# Patient Record
Sex: FEMALE | Race: WHITE | URBAN - METROPOLITAN AREA
[De-identification: names, ages, dates, MRNs, and addresses within clinical notes are randomized per-mention and may not be internally consistent; named-entity substitution may affect disease eponyms.]

---

## 2017-01-23 DIAGNOSIS — M54.50 LUMBAGO: Primary | ICD-10-CM

## 2017-04-05 ENCOUNTER — OFFICE VISIT (OUTPATIENT)
Dept: ORTHOPEDICS | Facility: CLINIC | Age: 22
End: 2017-04-05

## 2017-04-05 VITALS — WEIGHT: 146.7 LBS | BODY MASS INDEX: 25.04 KG/M2 | HEIGHT: 64 IN

## 2017-04-05 DIAGNOSIS — M54.6 BILATERAL THORACIC BACK PAIN, UNSPECIFIED CHRONICITY: Primary | ICD-10-CM

## 2017-04-05 ASSESSMENT — ENCOUNTER SYMPTOMS
BACK PAIN: 1
NECK PAIN: 1

## 2017-04-05 NOTE — NURSING NOTE
"Reason For Visit:   Chief Complaint   Patient presents with     Back Pain       Primary MD: Luis Felipe Munguia  Ref. MD: established    ?  No  Occupation Full-time student.  Currently working? No.  Date of injury:12/2016  Type of injury: Playing hokey.  Date of surgery: none  Smoker: No      Ht 1.632 m (5' 4.25\")  Wt 66.5 kg (146 lb 11.2 oz)  BMI 24.99 kg/m2    Pain Assessment  Patient Currently in Pain: Yes  0-10 Pain Scale: 5  Primary Pain Location: Back  Pain Orientation: Mid  Pain Descriptors: Constant, Dull  Alleviating Factors: Other (comment) (sometimes exercise)  Aggravating Factors: Other (comment), Lying (worse in morning)            "

## 2017-04-05 NOTE — MR AVS SNAPSHOT
"              After Visit Summary   4/5/2017    Merle Estrada    MRN: [id number]           Patient Information     Date Of Birth          1995        Visit Information        Provider Department      4/5/2017 7:15 AM Christie Larry MD [facility]        Today's Diagnoses     Bilateral thoracic back pain, unspecified chronicity    -  1       Follow-ups after your visit        Who to contact     Please call your clinic at [phone] to:    Ask questions about your health    Make or cancel appointments    Discuss your medicines    Learn about your test results    Speak to your doctor   If you have compliments or concerns about an experience at your clinic, or if you wish to file a complaint, please contact [facility] Patient Relations at [phone] or email us at [email or web address]         Additional Information About Your Visit        MyChart Information     [employer] gives you secure access to your electronic health record. If you see a primary care provider, you can also send messages to your care team and make appointments. If you have questions, please call your primary care clinic.  If you do not have a primary care provider, please call [phone] and they will assist you.      [employer] is an electronic gateway that provides easy, online access to your medical records. With [employer], you can request a clinic appointment, read your test results, renew a prescription or communicate with your care team.     To access your existing account, please contact your [facility] or call [phone] for assistance.        Care EveryWhere ID     This is your Care EveryWhere ID. This could be used by other organizations to access your [place] medical records  [id number]        Your Vitals Were     Height BMI (Body Mass Index)                1.632 m (5' 4.25\") 24.99 kg/m2           Blood Pressure from Last 3 Encounters: "   08/07/15 122/61   06/10/15 104/60   08/10/11 100/60    Weight from Last 3 Encounters:   04/05/17 66.5 kg (146 lb 11.2 oz)   10/07/15 63.5 kg (140 lb) (70 %)*   08/07/15 63.5 kg (140 lb) (71 %)*     * Growth percentiles are based on [facility] 2-20 Years data.              Today, you had the following     No orders found for display         Today's Medication Changes          These changes are accurate as of: 4/5/17 11:59 PM.  If you have any questions, ask your nurse or doctor.               Start taking these medicines.        Dose/Directions    diclofenac 1 % Gel topical gel   Commonly known as:  VOLTAREN   Used for:  Bilateral thoracic back pain, unspecified chronicity        Apply 2-4 grams to back up to four times daily using enclosed dosing card.   Quantity:  100 g   Refills:  1            Where to get your medicines      These medications were sent to [place] [address] [place] [address]  [address] [place] [address]     Phone:  [phone]     diclofenac 1 % Gel topical gel                Primary Care Provider Office Phone # Fax #    Luis Felipe Munguia [phone]       [facility] [place] [address]  [facility] [address]        Thank you!     Thank you for choosing [facility]  for your care. Our goal is always to provide you with excellent care. Hearing back from our patients is one way we can continue to improve our services. Please take a few minutes to complete the written survey that you may receive in the mail after your visit with us. Thank you!             Your Updated Medication List - Protect others around you: Learn how to safely use, store and throw away your medicines at www.disposemymeds.org.          This list is accurate as of: 4/5/17 11:59 PM.  Always use your most recent med list.                   Brand Name Dispense Instructions for use    diclofenac 1 % Gel topical gel    VOLTAREN    100 g    Apply 2-4 grams to back up  to four times daily using enclosed dosing card.       NO ACTIVE MEDICATIONS      .       ORTHO TRI-CYCLEN LO 0.18/0.215/0.25 MG-25 MCG per tablet   Generic drug:  norgestim-eth estrad triphasic      Reported on 4/5/2017

## 2017-04-05 NOTE — LETTER
4/5/2017       RE: Merle Estrada  [address]  [place] [facility] [address]     Dear Colleague,    Thank you for referring your patient, Merle Estrada, to the [facility] at [facility]. Please see a copy of my visit note below.    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 She has never had any numbness or tingling. No change in bowel/bladder. Her discomfort which has persisted has been rather band-like and across the mid to low thoracic level. She did complete the hockey season but has continued to have discomfort. She visited with the chiropractor and has done TENS in season.     PHYSICAL EXAM:  Young adult female in mild distress.   Respirations even and unlabored.  Ambulates without assistive device or antalgic gait.  BACK: No tenderness to palpation over the midline of the thoracic or lumbar spine. Not able to reproduce pain on palpation. Able to demonstrate full and normal forward bending, extension, and side to side bending. Able to demonstrate heel and toe rise. Sens intact and full to DP, SP, Sa, Inocencio, Tib nerve dist. Heel and toe rise intact. Strength 5/5 in quad, HS, TA, GcS.     IMAGING:   Thoracic MRI  Impression:  Schmorl's node-like indentations along the superior T11 and T12 endplates. Otherwise normal thoracic spine MRI    ASSESSMENT:  1. Low thoracic pain, band-like    PLAN:  -Discussed risks and benefits of additional nonoperative treatment such as oral NSAIDs and/or topical NSAIDs. I will review with Merle's ATC a rehab program for core/back. I reviewed her imaging with my spine surgery colleagues and if additional nonoperative treatment is not effective in improving symptoms consideration could be given to a transforaminal AUDELIA at T10-11 (at [facility]) given her band-like distribution of  pain.  -Merle is agreeable to this plan.     Again, thank you for allowing me to participate in the care of your patient.      Sincerely,    Christie Larry MD

## 2017-05-02 NOTE — PROGRESS NOTES
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 She has never had any numbness or tingling. No change in bowel/bladder. Her discomfort which has persisted has been rather band-like and across the mid to low thoracic level. She did complete the hockey season but has continued to have discomfort. She visited with the chiropractor and has done TENS in season.     PHYSICAL EXAM:  Young adult female in mild distress.   Respirations even and unlabored.  Ambulates without assistive device or antalgic gait.  BACK: No tenderness to palpation over the midline of the thoracic or lumbar spine. Not able to reproduce pain on palpation. Able to demonstrate full and normal forward bending, extension, and side to side bending. Able to demonstrate heel and toe rise. Sens intact and full to DP, SP, Sa, Inocencio, Tib nerve dist. Heel and toe rise intact. Strength 5/5 in quad, HS, TA, GcS.     IMAGING:   Thoracic MRI  Impression:  Schmorl's node-like indentations along the superior T11 and T12 endplates. Otherwise normal thoracic spine MRI    ASSESSMENT:  1. Low thoracic pain, band-like    PLAN:  -Discussed risks and benefits of additional nonoperative treatment such as oral NSAIDs and/or topical NSAIDs. I will review with Merle's ATC a rehab program for core/back. I reviewed her imaging with my spine surgery colleagues and if additional nonoperative treatment is not effective in improving symptoms consideration could be given to a transforaminal AUDELIA at T10-11 (at [facility]) given her band-like distribution of pain.  -Merle is agreeable to this plan.

## 2017-06-10 ENCOUNTER — HEALTH MAINTENANCE LETTER (OUTPATIENT)
Age: 22
End: 2017-06-10

## 2017-09-06 ENCOUNTER — OFFICE VISIT (OUTPATIENT)
Dept: ORTHOPEDICS | Facility: CLINIC | Age: 22
End: 2017-09-06

## 2017-09-06 DIAGNOSIS — M25.512 PAIN IN JOINT OF LEFT SHOULDER: Primary | ICD-10-CM

## 2017-09-06 DIAGNOSIS — S43.002A SHOULDER SUBLUXATION, LEFT, INITIAL ENCOUNTER: Primary | ICD-10-CM

## 2017-09-06 NOTE — MR AVS SNAPSHOT
After Visit Summary   9/6/2017    Merle Estrada    MRN: [id number]           Patient Information     Date Of Birth          1995        Visit Information        Provider Department      9/6/2017 5:45 PM Kemal Rosa MD [facility]        Today's Diagnoses     Shoulder subluxation, left, initial encounter    -  1       Follow-ups after your visit        Who to contact     Please call your clinic at [phone] to:    Ask questions about your health    Make or cancel appointments    Discuss your medicines    Learn about your test results    Speak to your doctor   If you have compliments or concerns about an experience at your clinic, or if you wish to file a complaint, please contact [facility] Patient Relations at [phone] or email us at [email or web address]         Additional Information About Your Visit        MyChart Information     [employer] gives you secure access to your electronic health record. If you see a primary care provider, you can also send messages to your care team and make appointments. If you have questions, please call your primary care clinic.  If you do not have a primary care provider, please call [phone] and they will assist you.      [employer] is an electronic gateway that provides easy, online access to your medical records. With [employer], you can request a clinic appointment, read your test results, renew a prescription or communicate with your care team.     To access your existing account, please contact your [facility] or call [phone] for assistance.        Care EveryWhere ID     This is your Care EveryWhere ID. This could be used by other organizations to access your [place] medical records  [id number]         Blood Pressure from Last 3 Encounters:   08/07/15 122/61   06/10/15 104/60   08/10/11 100/60    Weight from Last 3 Encounters:   04/05/17 146 lb  11.2 oz (66.5 kg)   10/07/15 140 lb (63.5 kg) (70 %)*   08/07/15 140 lb (63.5 kg) (71 %)*     * Growth percentiles are based on [facility] 2-20 Years data.              Today, you had the following     No orders found for display         Today's Medication Changes          These changes are accurate as of: 9/6/17 11:59 PM.  If you have any questions, ask your nurse or doctor.               Stop taking these medicines if you haven't already. Please contact your care team if you have questions.     NO ACTIVE MEDICATIONS   Stopped by:  Kemal Rosa MD                    Primary Care Provider Office Phone # Fax #    Luis Felipe Munguia [phone]       [facility] [place] [address]  [place] [facility] [address]        Equal Access to Services     Kern ValleyANGEL : Hadii deepti diego hadasho Soomaali, waaxda luqadaha, qaybta kaalmada adeegyada, lena guerrero . So [facility] [phone].    ATENCIÓN: Si habla español, tiene a parry disposición servicios gratuitos de asistencia lingüística. [facility] [phone].    We comply with applicable federal civil rights laws and [place] laws. We do not discriminate on the basis of race, color, national origin, age, disability sex, sexual orientation or gender identity.            Thank you!     Thank you for choosing [facility]  for your care. Our goal is always to provide you with excellent care. Hearing back from our patients is one way we can continue to improve our services. Please take a few minutes to complete the written survey that you may receive in the mail after your visit with us. Thank you!             Your Updated Medication List - Protect others around you: Learn how to safely use, store and throw away your medicines at www.disposemymeds.org.          This list is accurate as of: 9/6/17 11:59 PM.  Always use your most recent med list.                   Brand Name Dispense Instructions for use Diagnosis     diclofenac 1 % Gel topical gel    VOLTAREN    100 g    Apply 2-4 grams to back up to four times daily using enclosed dosing card.    Bilateral thoracic back pain, unspecified chronicity       ORTHO TRI-CYCLEN LO 0.18/0.215/0.25 MG-25 MCG per tablet   Generic drug:  norgestim-eth estrad triphasic      Reported on 4/5/2017

## 2017-09-06 NOTE — LETTER
"  9/6/2017      RE: Merle Estrada  [address] [place] [address]       CHIEF COMPLAINT:  Shoulder injury.       HISTORY OF PRESENT ILLNESS:  Merle Heredia is a 22-year-old [employer] women's  who sustained an injury today in practice.  She felt her shoulder go out.  She had spontaneous reduction.  Merle has had 2 previous issues with her shoulder \"slipping\" in high school.  She denies any numbness or tingling.  She had a bit of a dead arm sensation, but this has resolved.  She has pain.  She complains of pain both posterior as well as anterior.       PHYSICAL EXAMINATION:  Sensation intact to light touch over the lateral arm, lateral forearm and all digits.  Capillary refill brisk.  Fires her deltoid, biceps, triceps, wrist extensor, wrist flexor and hand muscles.  Forward flexion to 150, external rotation to 40, external rotation in abduction to 80, internal rotation in abduction to 30.  Her range of motion is somewhat uncomfortable.  Supraspinatus 4+/5, external rotator 4+/5, subscapularis 5/5.  She has a slightly positive anterior apprehension.  A slightly positive posterior apprehension.  She is quite uncomfortable.       IMPRESSION:   A 22-year-old women's  with a left shoulder subluxation.  I think it would be reasonable for Merle to undergo MR imaging to evaluate her shoulder at this time.  She agrees.  We also discussed return-to-play criteria.  I explained that she will need to have full range of motion, normal strength and go through a period of functional return.       PLAN:   1.  We will go ahead and obtain a left shoulder MRI to evaluate her labrum.   2.  She will continue her rehabilitation.   3.  She will follow up with Dr. Christie Larry for routine followup.  She could also follow up with me in training room clinic if that is easier.           MT BANGURA MD          cc: Christie Larry MD         D: 09/10/2017 15:46   T: 09/11/2017 05:41   MT: NATHEN "       Name:     BEL SCHUMACHER   MRN:      [id number]        Account:        [id number]   :      1995           Procedure Date: 2017       Document: [id number]

## 2017-09-11 NOTE — OP NOTE
"CHIEF COMPLAINT:  Shoulder injury.      HISTORY OF PRESENT ILLNESS:  Bel Heredia is a 22-year-old [employer] women's  who sustained an injury today in practice.  She felt her shoulder go out.  She had spontaneous reduction.  Bel has had 2 previous issues with her shoulder \"slipping\" in high school.  She denies any numbness or tingling.  She had a bit of a dead arm sensation, but this has resolved.  She has pain.  She complains of pain both posterior as well as anterior.      PHYSICAL EXAMINATION:  Sensation intact to light touch over the lateral arm, lateral forearm and all digits.  Capillary refill brisk.  Fires her deltoid, biceps, triceps, wrist extensor, wrist flexor and hand muscles.  Forward flexion to 150, external rotation to 40, external rotation in abduction to 80, internal rotation in abduction to 30.  Her range of motion is somewhat uncomfortable.  Supraspinatus 4+/5, external rotator 4+/5, subscapularis 5/5.  She has a slightly positive anterior apprehension.  A slightly positive posterior apprehension.  She is quite uncomfortable.      IMPRESSION:   A 22-year-old women's  with a left shoulder subluxation.  I think it would be reasonable for Bel to undergo MR imaging to evaluate her shoulder at this time.  She agrees.  We also discussed return-to-play criteria.  I explained that she will need to have full range of motion, normal strength and go through a period of functional return.      PLAN:   1.  We will go ahead and obtain a left shoulder MRI to evaluate her labrum.   2.  She will continue her rehabilitation.   3.  She will follow up with Dr. Christie Larry for routine followup.  She could also follow up with me in training room clinic if that is easier.         MT BANGURA MD             D: 09/10/2017 15:46   T: 2017 05:41   MT: NATHEN      Name:     BEL SCHUMACHER   MRN:      [id number]        Account:        [id number]   :      1995          "  Procedure Date: 09/06/2017      Document: [id number]       cc: Christie Larry MD

## 2017-09-15 NOTE — PROGRESS NOTES
"CHIEF COMPLAINT:  Shoulder injury.       HISTORY OF PRESENT ILLNESS:  Bel Heredia is a 22-year-old [employer] women's  who sustained an injury today in practice.  She felt her shoulder go out.  She had spontaneous reduction.  Bel has had 2 previous issues with her shoulder \"slipping\" in high school.  She denies any numbness or tingling.  She had a bit of a dead arm sensation, but this has resolved.  She has pain.  She complains of pain both posterior as well as anterior.       PHYSICAL EXAMINATION:  Sensation intact to light touch over the lateral arm, lateral forearm and all digits.  Capillary refill brisk.  Fires her deltoid, biceps, triceps, wrist extensor, wrist flexor and hand muscles.  Forward flexion to 150, external rotation to 40, external rotation in abduction to 80, internal rotation in abduction to 30.  Her range of motion is somewhat uncomfortable.  Supraspinatus 4+/5, external rotator 4+/5, subscapularis 5/5.  She has a slightly positive anterior apprehension.  A slightly positive posterior apprehension.  She is quite uncomfortable.       IMPRESSION:   A 22-year-old women's  with a left shoulder subluxation.  I think it would be reasonable for Bel to undergo MR imaging to evaluate her shoulder at this time.  She agrees.  We also discussed return-to-play criteria.  I explained that she will need to have full range of motion, normal strength and go through a period of functional return.       PLAN:   1.  We will go ahead and obtain a left shoulder MRI to evaluate her labrum.   2.  She will continue her rehabilitation.   3.  She will follow up with Dr. Christie aLrry for routine followup.  She could also follow up with me in training room clinic if that is easier.           MT BANGURA MD                D: 09/10/2017 15:46   T: 2017 05:41   MT: NATHEN       Name:     BEL SCHUMACHER   MRN:      [id number]        Account:        [id number]   :      " 1995           Procedure Date: 09/06/2017       Document: [id number]        cc: Christie Larry MD

## 2017-10-11 DIAGNOSIS — M54.6 BILATERAL THORACIC BACK PAIN, UNSPECIFIED CHRONICITY: ICD-10-CM

## 2018-04-02 ENCOUNTER — OFFICE VISIT (OUTPATIENT)
Dept: FAMILY MEDICINE | Facility: CLINIC | Age: 23
End: 2018-04-02
Payer: COMMERCIAL

## 2018-04-02 VITALS
BODY MASS INDEX: 23.16 KG/M2 | DIASTOLIC BLOOD PRESSURE: 69 MMHG | HEIGHT: 65 IN | HEART RATE: 65 BPM | SYSTOLIC BLOOD PRESSURE: 120 MMHG | WEIGHT: 139 LBS

## 2018-04-02 DIAGNOSIS — M54.6 CHRONIC BILATERAL THORACIC BACK PAIN: Primary | ICD-10-CM

## 2018-04-02 DIAGNOSIS — G89.29 CHRONIC BILATERAL THORACIC BACK PAIN: Primary | ICD-10-CM

## 2018-04-02 RX ORDER — DICLOFENAC SODIUM 75 MG/1
75 TABLET, DELAYED RELEASE ORAL 2 TIMES DAILY
Qty: 60 TABLET | Refills: 1 | Status: SHIPPED | OUTPATIENT
Start: 2018-04-02

## 2018-04-02 NOTE — MR AVS SNAPSHOT
After Visit Summary   4/2/2018    Merle Estrada    MRN: [id number]           Patient Information     Date Of Birth          1995        Visit Information        Provider Department      4/2/2018 10:45 AM Anjali Lombardo MD [facility]        Today's Diagnoses     Chronic bilateral thoracic back pain    -  1       Follow-ups after your visit        Additional Services     [facility] PT, HAND, AND CHIROPRACTIC REFERRAL       **This order will print in the [facility] Scheduling Office**    Physical Therapy, Hand Therapy and Chiropractic Care are available through:    *[facility]  *[facility]  *[facility]    Call one number to schedule at any of the above locations: [phone].    Your provider has referred you to: Mert Boyd DPT    Indication/Reason for Referral: Myofascial back pain  Therapy Orders: Evaluate and Treat  Special Programs:   Special Request:     8 visits as needed.       Please be aware that coverage of these services is subject to the terms and limitations of your health insurance plan.  Call member services at your health plan with any benefit or coverage questions.      Please bring the following to your appointment:    *Your personal calendar for scheduling future appointments  *Comfortable clothing                  Who to contact     Please call your clinic at [phone] to:    Ask questions about your health    Make or cancel appointments    Discuss your medicines    Learn about your test results    Speak to your doctor            Additional Information About Your Visit        MyChart Information     [employer] gives you secure access to your electronic health record. If you see a primary care provider, you can also send messages to your care team and make appointments. If you have questions, please call your primary care clinic.  If you do not have a primary care provider, please call [phone]  "and they will assist you.      [employer] is an electronic gateway that provides easy, online access to your medical records. With [employer], you can request a clinic appointment, read your test results, renew a prescription or communicate with your care team.     To access your existing account, please contact your [facility] or call [phone] for assistance.        Care EveryWhere ID     This is your Care EveryWhere ID. This could be used by other organizations to access your [facility] medical records  [id number]        Your Vitals Were     Pulse Height BMI (Body Mass Index)             65 5' 5\" (1.651 m) 23.13 kg/m2          Blood Pressure from Last 3 Encounters:   04/02/18 120/69   08/07/15 122/61   06/10/15 104/60    Weight from Last 3 Encounters:   04/02/18 139 lb (63 kg)   04/05/17 146 lb 11.2 oz (66.5 kg)   10/07/15 140 lb (63.5 kg) (70 %)*     * Growth percentiles are based on [facility] 2-20 Years data.              We Performed the Following     [facility] PT, HAND, AND CHIROPRACTIC REFERRAL          Today's Medication Changes          These changes are accurate as of 4/2/18 11:59 PM.  If you have any questions, ask your nurse or doctor.               Start taking these medicines.        Dose/Directions    diclofenac 75 MG EC tablet   Commonly known as:  VOLTAREN   Used for:  Chronic bilateral thoracic back pain   Started by:  Anjali Lombardo MD        Dose:  75 mg   Take 1 tablet (75 mg) by mouth 2 times daily   Quantity:  60 tablet   Refills:  1         These medicines have changed or have updated prescriptions.        Dose/Directions    * diclofenac 1 % Gel topical gel   Commonly known as:  VOLTAREN   This may have changed:  Another medication with the same name was added. Make sure you understand how and when to take each.   Used for:  Bilateral thoracic back pain, unspecified chronicity   Changed by:  Anjali Lombardo MD        Apply 2-4 grams to back up " to four times daily using enclosed dosing card.   Quantity:  100 g   Refills:  1       * diclofenac 1 % Gel topical gel   Commonly known as:  VOLTAREN   This may have changed:  You were already taking a medication with the same name, and this prescription was added. Make sure you understand how and when to take each.   Used for:  Chronic bilateral thoracic back pain   Changed by:  Anjali Lombardo MD        Dose:  4 g   Apply 4 g topically 4 times daily Apply 4 grams to mid-back four times daily as needed   Quantity:  100 g   Refills:  1       * Notice:  This list has 2 medication(s) that are the same as other medications prescribed for you. Read the directions carefully, and ask your doctor or other care provider to review them with you.         Where to get your medicines      These medications were sent to [place] [address] [place] [facility] [address]   [address] [facility] [address]     Phone:  [phone]     diclofenac 1 % Gel topical gel    diclofenac 75 MG EC tablet                Primary Care Provider Office Phone # Fax #    Luis Felipe Munguia [phone]       [facility] [address]  [facility] [address]        Equal Access to Services     [facility] CenterANGEL AH: Hadii aad ku hadasho Soomaali, waaxda luqadaha, qaybta kaalmada adeegyada, waxay isidroin hayjesikan erasto leung. So [facility] [phone].    ATENCIÓN: Si habla español, tiene a parry disposición servicios gratuitos de asistencia lingüística. Llame al [phone].    We comply with applicable federal civil rights laws and [place] laws. We do not discriminate on the basis of race, color, national origin, age, disability, sex, sexual orientation, or gender identity.            Thank you!     Thank you for choosing [facility]  for your care. Our goal is always to provide you with excellent care. Hearing back from our patients is one way we can continue to improve our  services. Please take a few minutes to complete the written survey that you may receive in the mail after your visit with us. Thank you!             Your Updated Medication List - Protect others around you: Learn how to safely use, store and throw away your medicines at www.disposemymeds.org.          This list is accurate as of 4/2/18 11:59 PM.  Always use your most recent med list.                   Brand Name Dispense Instructions for use Diagnosis    * diclofenac 1 % Gel topical gel    VOLTAREN    100 g    Apply 2-4 grams to back up to four times daily using enclosed dosing card.    Bilateral thoracic back pain, unspecified chronicity       * diclofenac 1 % Gel topical gel    VOLTAREN    100 g    Apply 4 g topically 4 times daily Apply 4 grams to mid-back four times daily as needed    Chronic bilateral thoracic back pain       diclofenac 75 MG EC tablet    VOLTAREN    60 tablet    Take 1 tablet (75 mg) by mouth 2 times daily    Chronic bilateral thoracic back pain       ORTHO TRI-CYCLEN LO 0.18/0.215/0.25 MG-25 MCG per tablet   Generic drug:  norgestim-eth estrad triphasic      Reported on 4/5/2017        * Notice:  This list has 2 medication(s) that are the same as other medications prescribed for you. Read the directions carefully, and ask your doctor or other care provider to review them with you.

## 2018-04-02 NOTE — PROGRESS NOTES
"S: 22 yo  female  here today for mid back pain. It started in Dec 2016 when she ran into the boards during practice.  She saw Dr. Larry in April 2017 regarding this pain that never resolved. She saw the Chiro and also did TENS.  A MRI was done which revealed some mild degenerative changes.  Dr. Larry told her that a AUDELIA at T10-11 was an option should she not improve with rehab.   -Improved over summer and then even more over the Fall semester as she did more rehab.    -Next to the spine in the mid back  -Always present but sometimes is worse.    -Voltaren gel helps  -Currently it is feeling worse since the season has been over  -No new injury  -Feels like tight muscles  -No radiation of pain to her buttocks. Legs or anteriorly and no n/t  -Not necessarily worse during practices/games  -Pretty sore near the end of the season and continuing to worsen.  Off for two weeks completely due to the end of the season.      O: NAD  /69  Pulse 65  Ht 5' 5\" (1.651 m)  Wt 139 lb (63 kg)  BMI 23.13 kg/m2  Thoracic and lumbar spine:  Mild limitation of flexion due to pain.  Mild discomfort in the mid back parapspinal area with extension.  Nttp over the SP.  Mild bilateral paraspinal tightness and ttp bilaterally from T8-12.    Normal LE reflexes and strength and sensation.  Neg slump testing.         A:  Chronic thoracic pain in a   with concern for thoracic DDD along with thoracic myofascial pain.   P:  Thoracic xrays and MRI  Refer to PT:  Mert Boyd at [facility]  Diclofenac 75 mg bid with meals for 2 weeks. Side effects discussed.  Avoid other NSAIDS.  Tylenol is ok.  Voltaren gel prescribed as well, since this has helped her in the past.     Angeline Colunga ATC and Eddie Cast MD, Sports Medicine Fellow were present for the entire appt.     Anjali Lombardo MD, CAQ, FACSM, CCD  [facility]  Sports Medicine and Bone Health  Team Physician;  Athletics      "

## 2018-04-02 NOTE — LETTER
"  4/2/2018      RE: Merle Ganrocio  [address] [place] [address]       S: 24 yo  female  here today for mid back pain. It started in Dec 2016 when she ran into the boards during practice.  She saw Dr. Larry in April 2017 regarding this pain that never resolved. She saw the Chiro and also did TENS.  A MRI was done which revealed some mild degenerative changes.  Dr. Larry told her that a AUDELIA at T10-11 was an option should she not improve with rehab.   -Improved over summer and then even more over the Fall semester as she did more rehab.    -Next to the spine in the mid back  -Always present but sometimes is worse.    -Voltaren gel helps  -Currently it is feeling worse since the season has been over  -No new injury  -Feels like tight muscles  -No radiation of pain to her buttocks. Legs or anteriorly and no n/t  -Not necessarily worse during practices/games  -Pretty sore near the end of the season and continuing to worsen.  Off for two weeks completely due to the end of the season.      O: NAD  /69  Pulse 65  Ht 5' 5\" (1.651 m)  Wt 139 lb (63 kg)  BMI 23.13 kg/m2  Thoracic and lumbar spine:  Mild limitation of flexion due to pain.  Mild discomfort in the mid back parapspinal area with extension.  Nttp over the SP.  Mild bilateral paraspinal tightness and ttp bilaterally from T8-12.    Normal LE reflexes and strength and sensation.  Neg slump testing.         A:  Chronic thoracic pain in a   with concern for thoracic DDD along with thoracic myofascial pain.   P:  Thoracic xrays and MRI  Refer to PT:  Mert Boyd at [facility]  Diclofenac 75 mg bid with meals for 2 weeks. Side effects discussed.  Avoid other NSAIDS.  Tylenol is ok.  Voltaren gel prescribed as well, since this has helped her in the past.     Angeline Colunga ATC and Eddie Cast MD, Sports Medicine Fellow were present for the entire appt.     Anjali Lombardo MD, CAQ, FACSM, CCD  [facility]" [place]  Sports Medicine and Bone Health  Team Physician;  Athletics

## 2018-04-05 ENCOUNTER — RADIANT APPOINTMENT (OUTPATIENT)
Dept: MRI IMAGING | Facility: CLINIC | Age: 23
End: 2018-04-05
Attending: FAMILY MEDICINE
Payer: COMMERCIAL

## 2018-04-05 ENCOUNTER — RADIANT APPOINTMENT (OUTPATIENT)
Dept: GENERAL RADIOLOGY | Facility: CLINIC | Age: 23
End: 2018-04-05
Attending: FAMILY MEDICINE
Payer: COMMERCIAL

## 2018-04-05 DIAGNOSIS — M54.6 CHRONIC BILATERAL THORACIC BACK PAIN: ICD-10-CM

## 2018-04-05 DIAGNOSIS — G89.29 CHRONIC BILATERAL THORACIC BACK PAIN: ICD-10-CM

## 2018-04-25 ENCOUNTER — OFFICE VISIT (OUTPATIENT)
Dept: ORTHOPEDICS | Facility: CLINIC | Age: 23
End: 2018-04-25
Payer: COMMERCIAL

## 2018-04-25 DIAGNOSIS — M25.312 INSTABILITY OF LEFT SHOULDER JOINT: Primary | ICD-10-CM

## 2018-04-25 NOTE — LETTER
Date:May 1, 2018      Provider requested that no letter be sent. Do not send.       [facility] Information

## 2018-04-25 NOTE — LETTER
2018      RE: Bel Estrada  [address] [place] [address]       Service Date: 2018      CHIEF COMPLAINT:  Followup left shoulder.      HISTORY OF PRESENT ILLNESS:  Bel is a 23-year-old [employer] women[employer]  who sustained a left shoulder posterior subluxation on 2017.  Overall, she has done quite well with rehabilitation.  She had a possible minor subluxation during the season.  Overall, she performed quite well.  She feels that continued rehabilitation will allow her to compete at a high level.      PHYSICAL EXAMINATION:  23-year-old female, alert and oriented, in no apparent distress.  Full and symmetrical range of motion.  5/5 rotator cuff strength.  Left shoulder reveals no AC joint tenderness.  No biceps groove tenderness.  Slightly positive Naturita's, negative Speed's.  No apprehension.  Slight discomfort on posterior apprehension.  She has a negative load and shift.  Negative posterior load and shift.      IMPRESSION:  23-year-old female with a left shoulder subluxation and posterior labral tear who is compensating quite well with rehabilitation.  Bel and I had a long conversation about her shoulder.  We discussed risk of reinjury.  However, she feels quite confident that her rehab will allow her to participate in her senior year.  We did discuss the risk of athletic participation and she understands.      PLAN:   1.  Continue rehabilitation.   2.  Continue hockey as tolerated.   3.  Follow up with me p.r.n.  However, we will see her at the end of her senior year for a final decision on how we will manage her shoulder.      I spent 15 minutes with this patient, 10 minutes dedicated to counseling, education and development of a treatment plan.         MT BANGURA MD             D: 2018   T: 2018   MT: BYRON      Name:     BEL ESTRADA   MRN:      [id number]        Account:      [id number]   :      1995           Service Date:  04/25/2018      Document: [id number]       Kemal Rosa MD

## 2018-04-25 NOTE — MR AVS SNAPSHOT
After Visit Summary   4/25/2018    Merle Estrada    MRN: [id number]           Patient Information     Date Of Birth          1995        Visit Information        Provider Department      4/25/2018 7:30 PM Kemal Rosa MD [facility]        Today's Diagnoses     Instability of left shoulder joint    -  1       Follow-ups after your visit        Who to contact     Please call your clinic at [phone] to:    Ask questions about your health    Make or cancel appointments    Discuss your medicines    Learn about your test results    Speak to your doctor            Additional Information About Your Visit        MyChart Information     [employer] gives you secure access to your electronic health record. If you see a primary care provider, you can also send messages to your care team and make appointments. If you have questions, please call your primary care clinic.  If you do not have a primary care provider, please call [phone] and they will assist you.      [employer] is an electronic gateway that provides easy, online access to your medical records. With [employer], you can request a clinic appointment, read your test results, renew a prescription or communicate with your care team.     To access your existing account, please contact your [facility] or call [phone] for assistance.        Care EveryWhere ID     This is your Care EveryWhere ID. This could be used by other organizations to access your [facility] medical records  [id number]         Blood Pressure from Last 3 Encounters:   04/02/18 120/69   08/07/15 122/61   06/10/15 104/60    Weight from Last 3 Encounters:   04/02/18 139 lb (63 kg)   04/05/17 146 lb 11.2 oz (66.5 kg)   10/07/15 140 lb (63.5 kg) (70 %)*     * Growth percentiles are based on CDC 2-20 Years data.              Today, you had the following     No orders found for display       Primary Care Provider Office  Phone # Fax #    Luis Felipe Munguia [phone]       [facility] [place] [address]  [place] [facility] [address]        Equal Access to Services     FELICITYKATHY WILLIAM : Kieran tatum ku consueloo Sonupurali, waaxda luqadaha, qaybta kaalmada adeegyada, lena raymondsanaz leung. So [facility] [place] [phone].    ATENCIÓN: Si habla español, tiene a parry disposición servicios gratuitos de asistencia lingüística. Llame al [phone].    We comply with applicable federal civil rights laws and [place] laws. We do not discriminate on the basis of race, color, national origin, age, disability, sex, sexual orientation, or gender identity.            Thank you!     Thank you for choosing [facility]  for your care. Our goal is always to provide you with excellent care. Hearing back from our patients is one way we can continue to improve our services. Please take a few minutes to complete the written survey that you may receive in the mail after your visit with us. Thank you!             Your Updated Medication List - Protect others around you: Learn how to safely use, store and throw away your medicines at www.disposemymeds.org.          This list is accurate as of 4/25/18 11:59 PM.  Always use your most recent med list.                   Brand Name Dispense Instructions for use Diagnosis    * diclofenac 1 % Gel topical gel    VOLTAREN    100 g    Apply 2-4 grams to back up to four times daily using enclosed dosing card.    Bilateral thoracic back pain, unspecified chronicity       * diclofenac 1 % Gel topical gel    VOLTAREN    100 g    Apply 4 g topically 4 times daily Apply 4 grams to mid-back four times daily as needed    Chronic bilateral thoracic back pain       diclofenac 75 MG EC tablet    VOLTAREN    60 tablet    Take 1 tablet (75 mg) by mouth 2 times daily    Chronic bilateral thoracic back pain       ORTHO TRI-CYCLEN LO 0.18/0.215/0.25 MG-25 MCG per tablet   Generic drug:   norgestim-eth estrad triphasic      Reported on 4/5/2017        * Notice:  This list has 2 medication(s) that are the same as other medications prescribed for you. Read the directions carefully, and ask your doctor or other care provider to review them with you.

## 2018-04-30 NOTE — PROGRESS NOTES
Service Date: 2018      CHIEF COMPLAINT:  Followup left shoulder.      HISTORY OF PRESENT ILLNESS:  Bel is a 23-year-old [employer] women[employer]  who sustained a left shoulder posterior subluxation on 2017.  Overall, she has done quite well with rehabilitation.  She had a possible minor subluxation during the season.  Overall, she performed quite well.  She feels that continued rehabilitation will allow her to compete at a high level.      PHYSICAL EXAMINATION:  23-year-old female, alert and oriented, in no apparent distress.  Full and symmetrical range of motion.  5/5 rotator cuff strength.  Left shoulder reveals no AC joint tenderness.  No biceps groove tenderness.  Slightly positive Waverly's, negative Speed's.  No apprehension.  Slight discomfort on posterior apprehension.  She has a negative load and shift.  Negative posterior load and shift.      IMPRESSION:  23-year-old female with a left shoulder subluxation and posterior labral tear who is compensating quite well with rehabilitation.  Bel and I had a long conversation about her shoulder.  We discussed risk of reinjury.  However, she feels quite confident that her rehab will allow her to participate in her senior year.  We did discuss the risk of athletic participation and she understands.      PLAN:   1.  Continue rehabilitation.   2.  Continue hockey as tolerated.   3.  Follow up with me p.r.n.  However, we will see her at the end of her senior year for a final decision on how we will manage her shoulder.      I spent 15 minutes with this patient, 10 minutes dedicated to counseling, education and development of a treatment plan.         MT BANGURA MD             D: 2018   T: 2018   MT: BYRON      Name:     BEL SCHUMACHER   MRN:      [id number]        Account:      [id number]   :      1995           Service Date: 2018      Document: [id number]

## 2018-07-05 DIAGNOSIS — M79.18 CHRONIC MYOFASCIAL PAIN: Primary | ICD-10-CM

## 2018-07-05 DIAGNOSIS — G89.29 CHRONIC MYOFASCIAL PAIN: Primary | ICD-10-CM

## 2018-07-05 RX ORDER — CYCLOBENZAPRINE HCL 10 MG
10 TABLET ORAL
Qty: 30 TABLET | Refills: 0 | Status: SHIPPED | OUTPATIENT
Start: 2018-07-05 | End: 2018-10-25

## 2018-07-06 DIAGNOSIS — M54.6 BILATERAL THORACIC BACK PAIN, UNSPECIFIED CHRONICITY: ICD-10-CM

## 2018-10-25 ENCOUNTER — OFFICE VISIT (OUTPATIENT)
Dept: FAMILY MEDICINE | Facility: CLINIC | Age: 23
End: 2018-10-25
Payer: COMMERCIAL

## 2018-10-25 VITALS
BODY MASS INDEX: 23.26 KG/M2 | WEIGHT: 139.6 LBS | DIASTOLIC BLOOD PRESSURE: 71 MMHG | HEIGHT: 65 IN | SYSTOLIC BLOOD PRESSURE: 121 MMHG | HEART RATE: 74 BPM

## 2018-10-25 DIAGNOSIS — G89.29 CHRONIC MYOFASCIAL PAIN: ICD-10-CM

## 2018-10-25 DIAGNOSIS — M54.6 CHRONIC BILATERAL THORACIC BACK PAIN: ICD-10-CM

## 2018-10-25 DIAGNOSIS — M79.18 CHRONIC MYOFASCIAL PAIN: ICD-10-CM

## 2018-10-25 DIAGNOSIS — G89.29 CHRONIC BILATERAL THORACIC BACK PAIN: ICD-10-CM

## 2018-10-25 RX ORDER — LIDOCAINE 50 MG/G
PATCH TOPICAL
Qty: 30 PATCH | Refills: 0 | Status: SHIPPED | OUTPATIENT
Start: 2018-10-25

## 2018-10-25 RX ORDER — CYCLOBENZAPRINE HCL 10 MG
10 TABLET ORAL
Qty: 20 TABLET | Refills: 0 | Status: SHIPPED | OUTPATIENT
Start: 2018-10-25

## 2018-10-25 ASSESSMENT — ENCOUNTER SYMPTOMS
CONSTITUTIONAL NEGATIVE: 1
FALLS: 0
PSYCHIATRIC NEGATIVE: 1
MYALGIAS: 1
NEUROLOGICAL NEGATIVE: 1
NECK PAIN: 0
BACK PAIN: 1

## 2018-10-25 NOTE — LETTER
10/25/2018      RE: Merle Ganhangeliza  [address] [place] [address]       HPI  In for f/u of now chronic thoracic back pain.  Some degenerative changes and small lower T spine disc issue.  Has been stable and steady but now with some Lumbar back pain.  Some seems from working out and from studying more.  Same type of pain but in a different spot.  Seems muscular to her.  L greater than R.  Stays in low back.  No radicular sxs.   No groin or leg pain.  Has used voltaren gel and rehab.  Helped some with that.  Able to practice.  Just not 100%.  Slept well and feels better today.    Review of Systems   Constitutional: Negative.    Musculoskeletal: Positive for back pain and myalgias. Negative for falls, joint pain and neck pain.   Skin: Negative.    Neurological: Negative.    Psychiatric/Behavioral: Negative.      Physical Exam   Constitutional: She is oriented to person, place, and time and well-developed, well-nourished, and in no distress. No distress.   Musculoskeletal: Normal range of motion. She exhibits tenderness. She exhibits no edema or deformity.   Neg Stork  Neg SLR  Neg MAGDALENO/FADIR  Neuro intact   Neurological: She is alert and oriented to person, place, and time. She displays normal reflexes. No cranial nerve deficit. She exhibits normal muscle tone. Gait normal. Coordination normal.   Skin: She is not diaphoretic.     Chronic thoracic back pain now with some lumbar muscle spasm  -continue voltaren/rehab  -short term cyclobenzaprine  -activity as tolerated      Elias Boyd MD

## 2018-10-25 NOTE — MR AVS SNAPSHOT
"              After Visit Summary   10/25/2018    Merle Estrada    MRN: [id number]           Patient Information     Date Of Birth          1995        Visit Information        Provider Department      10/25/2018 10:30 AM Elias Boyd MD [facility]        Today's Diagnoses     Chronic bilateral thoracic back pain        Chronic myofascial pain           Follow-ups after your visit        Who to contact     Please call your clinic at [phone] to:    Ask questions about your health    Make or cancel appointments    Discuss your medicines    Learn about your test results    Speak to your doctor            Additional Information About Your Visit        MyChart Information     [employer] gives you secure access to your electronic health record. If you see a primary care provider, you can also send messages to your care team and make appointments. If you have questions, please call your primary care clinic.  If you do not have a primary care provider, please call [phone] and they will assist you.      [employer] is an electronic gateway that provides easy, online access to your medical records. With [employer], you can request a clinic appointment, read your test results, renew a prescription or communicate with your care team.     To access your existing account, please contact your [facility] or call [phone] for assistance.        Care EveryWhere ID     This is your Care EveryWhere ID. This could be used by other organizations to access your [facility] medical records  [id number]        Your Vitals Were     Pulse Height Last Period Breastfeeding? BMI (Body Mass Index)       74 1.651 m (5' 5\") (LMP Unknown) No 23.23 kg/m2        Blood Pressure from Last 3 Encounters:   10/25/18 121/71   04/02/18 120/69   08/07/15 122/61    Weight from Last 3 Encounters:   10/25/18 63.3 kg (139 lb 9.6 oz)   04/02/18 63 kg (139 lb)   04/05/17 66.5 kg (146 lb 11.2 oz)    "           Today, you had the following     No orders found for display         Today's Medication Changes          These changes are accurate as of 10/25/18 11:23 AM.  If you have any questions, ask your nurse or doctor.               Start taking these medicines.        Dose/Directions    lidocaine 5 % Patch   Commonly known as:  LIDODERM   Used for:  Chronic bilateral thoracic back pain, Chronic myofascial pain   Started by:  Elias Boyd MD        Apply up to 3 patches to painful area at once for up to 12 h within a 24 h period.  Remove after 12 hours.   Quantity:  30 patch   Refills:  0            Where to get your medicines      These medications were sent to [id number] - [place], [place] - [address]  [address] , [facility] [address]     Phone:  [phone]     cyclobenzaprine 10 MG tablet    diclofenac 1 % Gel topical gel    lidocaine 5 % Patch                Primary Care Provider Office Phone # Fax #    Luis Felipe Munguia [phone]       [facility] [address] [facility] [address] [facility] [address]  [facility] [address]        Equal Access to Services     [facility]ANGEL AH: Hadii deepti ku hadasho Soomaali, waaxda luqadaha, qaybta kaalmada adeegyada, waxay idiin hayjesikan erasto guerrero . So [facility] [phone].    ATENCIÓN: Si habla español, tiene a parry disposición servicios gratuitos de asistencia lingüística. Ll[facility] [phone].    We comply with applicable federal civil rights laws and [place] laws. We do not discriminate on the basis of race, color, national origin, age, disability, sex, sexual orientation, or gender identity.            Thank you!     Thank you for choosing [facility]  for your care. Our goal is always to provide you with excellent care. Hearing back from our patients is one way we can continue to improve our services. Please take a few minutes to complete the written survey that you may receive in the mail after your visit with us. Thank  you!             Your Updated Medication List - Protect others around you: Learn how to safely use, store and throw away your medicines at www.disposemymeds.org.          This list is accurate as of 10/25/18 11:23 AM.  Always use your most recent med list.                   Brand Name Dispense Instructions for use Diagnosis    cyclobenzaprine 10 MG tablet    FLEXERIL    20 tablet    Take 1 tablet (10 mg) by mouth nightly as needed for muscle spasms    Chronic myofascial pain       * diclofenac 1 % Gel topical gel    VOLTAREN    100 g    Apply 2-4 grams to back up to four times daily using enclosed dosing card.    Bilateral thoracic back pain, unspecified chronicity       * diclofenac 1 % Gel topical gel    VOLTAREN    100 g    Apply 4 g topically 4 times daily Apply 4 grams to mid-back four times daily as needed    Chronic bilateral thoracic back pain       diclofenac 75 MG EC tablet    VOLTAREN    60 tablet    Take 1 tablet (75 mg) by mouth 2 times daily    Chronic bilateral thoracic back pain       lidocaine 5 % Patch    LIDODERM    30 patch    Apply up to 3 patches to painful area at once for up to 12 h within a 24 h period.  Remove after 12 hours.    Chronic bilateral thoracic back pain, Chronic myofascial pain       ORTHO TRI-CYCLEN LO 0.18/0.215/0.25 MG-25 MCG per tablet   Generic drug:  norgestim-eth estrad triphasic      Reported on 4/5/2017        * Notice:  This list has 2 medication(s) that are the same as other medications prescribed for you. Read the directions carefully, and ask your doctor or other care provider to review them with you.

## 2018-10-25 NOTE — PROGRESS NOTES
HPI  In for f/u of now chronic thoracic back pain.  Some degenerative changes and small lower T spine disc issue.  Has been stable and steady but now with some Lumbar back pain.  Some seems from working out and from studying more.  Same type of pain but in a different spot.  Seems muscular to her.  L greater than R.  Stays in low back.  No radicular sxs.   No groin or leg pain.  Has used voltaren gel and rehab.  Helped some with that.  Able to practice.  Just not 100%.  Slept well and feels better today.    Review of Systems   Constitutional: Negative.    Musculoskeletal: Positive for back pain and myalgias. Negative for falls, joint pain and neck pain.   Skin: Negative.    Neurological: Negative.    Psychiatric/Behavioral: Negative.          Physical Exam   Constitutional: She is oriented to person, place, and time and well-developed, well-nourished, and in no distress. No distress.   Musculoskeletal: Normal range of motion. She exhibits tenderness. She exhibits no edema or deformity.   Neg Stork  Neg SLR  Neg MAGDALENO/FADIR  Neuro intact   Neurological: She is alert and oriented to person, place, and time. She displays normal reflexes. No cranial nerve deficit. She exhibits normal muscle tone. Gait normal. Coordination normal.   Skin: She is not diaphoretic.       Chronic thoracic back pain now with some lumbar muscle spasm  -continue voltaren/rehab  -short term cyclobenzaprine  -activity as tolerated

## 2019-02-06 DIAGNOSIS — M54.6 CHRONIC BILATERAL THORACIC BACK PAIN: ICD-10-CM

## 2019-02-06 DIAGNOSIS — G89.29 CHRONIC BILATERAL THORACIC BACK PAIN: ICD-10-CM

## 2019-03-13 DIAGNOSIS — G89.29 CHRONIC MYOFASCIAL PAIN: ICD-10-CM

## 2019-03-13 DIAGNOSIS — G89.29 CHRONIC THORACIC BACK PAIN, UNSPECIFIED BACK PAIN LATERALITY: Primary | ICD-10-CM

## 2019-03-13 DIAGNOSIS — M54.6 CHRONIC THORACIC BACK PAIN, UNSPECIFIED BACK PAIN LATERALITY: Primary | ICD-10-CM

## 2019-03-13 DIAGNOSIS — M79.18 CHRONIC MYOFASCIAL PAIN: ICD-10-CM

## 2019-03-13 RX ORDER — GABAPENTIN 300 MG/1
300 CAPSULE ORAL
Qty: 31 CAPSULE | Refills: 1 | Status: SHIPPED | OUTPATIENT
Start: 2019-03-13

## 2019-03-13 RX ORDER — MELOXICAM 15 MG/1
15 TABLET ORAL DAILY
Qty: 31 TABLET | Refills: 1 | Status: SHIPPED | OUTPATIENT
Start: 2019-03-13

## 2019-04-15 DIAGNOSIS — M79.674 TOE PAIN, BILATERAL: ICD-10-CM

## 2019-04-15 DIAGNOSIS — M79.675 TOE PAIN, BILATERAL: ICD-10-CM

## 2019-04-15 DIAGNOSIS — M79.675 TOE PAIN, LEFT: Primary | ICD-10-CM

## 2019-04-16 ENCOUNTER — ANCILLARY PROCEDURE (OUTPATIENT)
Dept: GENERAL RADIOLOGY | Facility: CLINIC | Age: 24
End: 2019-04-16
Attending: PEDIATRICS
Payer: COMMERCIAL

## 2019-04-16 DIAGNOSIS — M79.674 TOE PAIN, BILATERAL: ICD-10-CM

## 2019-04-16 DIAGNOSIS — M79.675 TOE PAIN, BILATERAL: ICD-10-CM

## 2019-04-17 ENCOUNTER — OFFICE VISIT (OUTPATIENT)
Dept: ORTHOPEDICS | Facility: CLINIC | Age: 24
End: 2019-04-17
Payer: COMMERCIAL

## 2019-04-17 VITALS
SYSTOLIC BLOOD PRESSURE: 110 MMHG | DIASTOLIC BLOOD PRESSURE: 60 MMHG | WEIGHT: 137.2 LBS | HEIGHT: 64 IN | BODY MASS INDEX: 23.42 KG/M2 | HEART RATE: 68 BPM

## 2019-04-17 DIAGNOSIS — G89.29 CHRONIC BILATERAL THORACIC BACK PAIN: ICD-10-CM

## 2019-04-17 DIAGNOSIS — M54.6 CHRONIC BILATERAL THORACIC BACK PAIN: ICD-10-CM

## 2019-04-17 DIAGNOSIS — M25.532 LEFT WRIST PAIN: ICD-10-CM

## 2019-04-17 DIAGNOSIS — M79.675 TOE PAIN, LEFT: Primary | ICD-10-CM

## 2019-04-17 ASSESSMENT — MIFFLIN-ST. JEOR: SCORE: 1362.34

## 2019-04-17 NOTE — LETTER
4/17/2019      RE: Merle Estrada  [address] [place] [address]       [facility] CLINIC FOLLOW UP    Merle Estrada MRN# [id number]   Age: 24 year old YOB: 1995           Chief Complaint:     1. Bilateral toe pain  2. Bilateral wrist pain  3. 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     Has had chronic bilateral wrist pain, worse with stickhandling and shooting. In the past noted a cyst over the right dorsal wrist that has since resolved. Localizes pain diffusely over dorsal wrist. Previous xrays obtained in 2015 showed possible left SL interval widening. At that time her sx had improved and there was no NEENA consistent with SL ligament tear. She has continued to have pain. Better with rest.    Has had chronic thoracic back pain. Previous MRIs in 2017 and 2018 normal except for mild disc bulge at T10-12 without neural foraminal stenosis. Localizes pain along left lateral thoracic spine. Has tried chiropractic with only temporary relief. Yoga helping.    During 2nd half of this season, developed bilateral great toe pain along plantar aspect. Only has pain when in skate. Tried to modify skate. Occasional numbness into toes. Localizes over sesamoids. Able to run without significant pain.              Medications:     Current Outpatient Medications   Medication Sig     diclofenac (VOLTAREN) 1 % GEL topical gel Apply 2-4 grams to back up to four times daily using enclosed dosing card.     gabapentin (NEURONTIN) 300 MG capsule Take 1 capsule (300 mg) by mouth nightly as needed (back pain)     meloxicam (MOBIC) 15 MG tablet Take 1 tablet (15 mg) by mouth daily     norgestim-eth estrad triphasic (ORTHO TRI-CYCLEN LO) 0.18/0.215/0.25 MG-25 MCG TABS Reported on 4/5/2017     cyclobenzaprine (FLEXERIL) 10 MG tablet Take 1 tablet (10 mg) by mouth nightly as needed for muscle spasms  "(Patient not taking: Reported on 4/17/2019)     diclofenac (VOLTAREN) 1 % topical gel Apply 4 g topically 4 times daily Apply 4 grams to mid-back four times daily as needed     diclofenac (VOLTAREN) 75 MG EC tablet Take 1 tablet (75 mg) by mouth 2 times daily (Patient not taking: Reported on 10/25/2018)     lidocaine (LIDODERM) 5 % Patch Apply up to 3 patches to painful area at once for up to 12 h within a 24 h period.  Remove after 12 hours. (Patient not taking: Reported on 4/17/2019)     No current facility-administered medications for this visit.              Allergies:      Allergies   Allergen Reactions     No Known Drug Allergies             Review of Systems:   A comprehensive 10 point review of systems (constitutional, ENT, cardiac, peripheral vascular, respiratory, GI, , Musculoskeletal, skin, Neurological) was performed and found to be negative except as described in this note.           Physical Exam:   COMPLETE EXAMINATION:   VITAL SIGNS: /60   Pulse 68   Ht 1.626 m (5' 4\")   Wt 62.2 kg (137 lb 3.2 oz)   LMP 03/22/2019 (Approximate)   BMI 23.55 kg/m     GEN: Alert, well-nourished, and in no distress.   NEURO: Alert and oriented to person, place, and time. Gait normal.   SKIN: No rash, warmth or erythema  PSYCH: Mood, memory, affect and judgment normal.   MSK: Spine; straight, normal ROM, TTP along left thoracic spine at T7-10 with some facet restriction here  Wrists: TTP at SL interval, no swelling, normal ROM, Ozuna test negative, DRUJ intact  Feet: TTP over sesamoids, but also in between 1st-2nd MT heads and metatarsal squeeze with second toe extended reproduces pain on the right. Normal ROM of great toe, no swelling.         Imaging:     3V bilateral great toes obtained on 4/16/17 reveal no osseous abnormality    Thoracic Spine MRI 4/5/2018:   1. No significant change since 3/29/2017.  2. Disc degeneration and shallow disc bulge formation T10-T12. No  significant spinal canal or neural " foraminal stenosis.  3. Superior endplate Schmorl's node deformities T10-T12.    3V left wrist 9/24/2015:    1. Slight dorsal tilting of the lunate bone, as described above.  2. Mild widening of the scapholunate joint space concerning for  interosseous scapholunate ligamentous injury.      3V right wrist 10/1/2015 normal        Assessment:     1. Chronic myofascial thoracic spine pain with facet restriction  2. Bilateral wrist pain - probable previous ganglion cyst, rule out left scapholunate ligament tear  3. Bilateral plantar great toe pain - neuroma vs sesamoiditis        Plan:     1. PT for back pain  2. MRA left wrist to R/O SL ligament tear or other internal derangment  3. MRI left foot to rule out sesamoiditis. If negative, consider neuroma.  4. She has follow up for her previous shoulder dislocation with Dr. Moe mendez  5. Follow up in 2 weeks.    Leah Colunga was present for the entire visit.      Leah Jesus MD

## 2019-04-18 DIAGNOSIS — M25.512 PAIN IN JOINT OF LEFT SHOULDER: Primary | ICD-10-CM

## 2019-04-18 NOTE — PROGRESS NOTES
[facility] CLINIC FOLLOW UP    Merle Estrada MRN# [id number]   Age: 24 year old YOB: 1995           Chief Complaint:     1. Bilateral toe pain  2. Bilateral wrist pain  3. 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     Has had chronic bilateral wrist pain, worse with stickhandling and shooting. In the past noted a cyst over the right dorsal wrist that has since resolved. Localizes pain diffusely over dorsal wrist. Previous xrays obtained in 2015 showed possible left SL interval widening. At that time her sx had improved and there was no NEENA consistent with SL ligament tear. She has continued to have pain. Better with rest.    Has had chronic thoracic back pain. Previous MRIs in 2017 and 2018 normal except for mild disc bulge at T10-12 without neural foraminal stenosis. Localizes pain along left lateral thoracic spine. Has tried chiropractic with only temporary relief. Yoga helping.    During 2nd half of this season, developed bilateral great toe pain along plantar aspect. Only has pain when in skate. Tried to modify skate. Occasional numbness into toes. Localizes over sesamoids. Able to run without significant pain.              Medications:     Current Outpatient Medications   Medication Sig     diclofenac (VOLTAREN) 1 % GEL topical gel Apply 2-4 grams to back up to four times daily using enclosed dosing card.     gabapentin (NEURONTIN) 300 MG capsule Take 1 capsule (300 mg) by mouth nightly as needed (back pain)     meloxicam (MOBIC) 15 MG tablet Take 1 tablet (15 mg) by mouth daily     norgestim-eth estrad triphasic (ORTHO TRI-CYCLEN LO) 0.18/0.215/0.25 MG-25 MCG TABS Reported on 4/5/2017     cyclobenzaprine (FLEXERIL) 10 MG tablet Take 1 tablet (10 mg) by mouth nightly as needed for muscle spasms (Patient not taking: Reported on 4/17/2019)     diclofenac (VOLTAREN) 1 % topical gel  "Apply 4 g topically 4 times daily Apply 4 grams to mid-back four times daily as needed     diclofenac (VOLTAREN) 75 MG EC tablet Take 1 tablet (75 mg) by mouth 2 times daily (Patient not taking: Reported on 10/25/2018)     lidocaine (LIDODERM) 5 % Patch Apply up to 3 patches to painful area at once for up to 12 h within a 24 h period.  Remove after 12 hours. (Patient not taking: Reported on 4/17/2019)     No current facility-administered medications for this visit.              Allergies:      Allergies   Allergen Reactions     No Known Drug Allergies             Review of Systems:   A comprehensive 10 point review of systems (constitutional, ENT, cardiac, peripheral vascular, respiratory, GI, , Musculoskeletal, skin, Neurological) was performed and found to be negative except as described in this note.           Physical Exam:   COMPLETE EXAMINATION:   VITAL SIGNS: /60   Pulse 68   Ht 1.626 m (5' 4\")   Wt 62.2 kg (137 lb 3.2 oz)   LMP 03/22/2019 (Approximate)   BMI 23.55 kg/m    GEN: Alert, well-nourished, and in no distress.   NEURO: Alert and oriented to person, place, and time. Gait normal.   SKIN: No rash, warmth or erythema  PSYCH: Mood, memory, affect and judgment normal.   MSK: Spine; straight, normal ROM, TTP along left thoracic spine at T7-10 with some facet restriction here  Wrists: TTP at SL interval, no swelling, normal ROM, Ozuna test negative, DRUJ intact  Feet: TTP over sesamoids, but also in between 1st-2nd MT heads and metatarsal squeeze with second toe extended reproduces pain on the right. Normal ROM of great toe, no swelling.         Imaging:     3V bilateral great toes obtained on 4/16/17 reveal no osseous abnormality    Thoracic Spine MRI 4/5/2018:   1. No significant change since 3/29/2017.  2. Disc degeneration and shallow disc bulge formation T10-T12. No  significant spinal canal or neural foraminal stenosis.  3. Superior endplate Schmorl's node deformities T10-T12.    3V left " wrist 9/24/2015:    1. Slight dorsal tilting of the lunate bone, as described above.  2. Mild widening of the scapholunate joint space concerning for  interosseous scapholunate ligamentous injury.      3V right wrist 10/1/2015 normal        Assessment:     1. Chronic myofascial thoracic spine pain with facet restriction  2. Bilateral wrist pain - probable previous ganglion cyst, rule out left scapholunate ligament tear  3. Bilateral plantar great toe pain - neuroma vs sesamoiditis        Plan:     1. PT for back pain  2. MRA left wrist to R/O SL ligament tear or other internal derangment  3. MRI left foot to rule out sesamoiditis. If negative, consider neuroma.  4. She has follow up for her previous shoulder dislocation with Dr. Moe mendez  5. Follow up in 2 weeks.    Leah Colunga was present for the entire visit.

## 2019-04-22 ENCOUNTER — ANCILLARY PROCEDURE (OUTPATIENT)
Dept: MRI IMAGING | Facility: CLINIC | Age: 24
End: 2019-04-22
Attending: ORTHOPAEDIC SURGERY
Payer: COMMERCIAL

## 2019-04-22 DIAGNOSIS — M25.512 PAIN IN JOINT OF LEFT SHOULDER: ICD-10-CM

## 2019-04-24 ENCOUNTER — OFFICE VISIT (OUTPATIENT)
Dept: ORTHOPEDICS | Facility: CLINIC | Age: 24
End: 2019-04-24
Payer: COMMERCIAL

## 2019-04-24 DIAGNOSIS — M25.312 INSTABILITY OF LEFT SHOULDER JOINT: Primary | ICD-10-CM

## 2019-04-24 NOTE — LETTER
"  4/24/2019      RE: Merle Estrada  [address]  [place] [address]       Service Date: 04/24/2019      CHIEF COMPLAINT:  Left shoulder instability.      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  She presents to discuss her left shoulder.  Merle did reasonably well over the season.  However, her shoulder continues to have issues with instability.  She feels \"weak.\"  Her symptoms are primarily when she brings her arm across her body.  She has a recent new MRI.      PHYSICAL EXAMINATION:  Evaluation of bilateral shoulders reveal full and symmetrical range of motion.  Her rotator cuff strength is 5/5.  She has a negative apprehension.  She has positive posterior apprehension.  She has a negative anterior load and shift.  She has a 2+ posterior load with some crepitation.      MRI:  I reviewed the patient's new MRI.  She does have an older reverse Hill-Sachs.  She does have a posterior labral tear.  No bony loss but her rotator cuff is intact.      IMPRESSION:  24-year-old [place]  with left shoulder posterior instability.  Merle does have a posterior labral tear.  We had a long conversation today about options.  She is interested in surgery, but would like to wait to see if she makes a [employer] camp.  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  We discussed the surgery.  I explained that we would arthroscopically repair her labrum.  We discussed the risks including bleeding, infection, nerve damage, complications from anesthesia, blood clot, etc.  We also discussed the more pertinent risks with this type of surgery, including failure of the repair with resultant instability.  She may end up with a stiff shoulder that could be problematic.  There could be hardware complications.  She may develop arthritis " over time.  She understands the surgery as well as the risks.      PLAN:   1.  We will wait to see if Bel is invited to a [employer] camp.  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   2.  I will speak with Shiv Handley about our policy regarding pro athletics when our athletes participate in a league that does not offer health insurance.   3.  When we decide to proceed with surgery, the surgery will be a left shoulder arthroscopic posterior labral repair.  This will be done in the lateral decubitus position.      I spent 15 minutes with this patient, 10 minutes dedicated to counseling, education and development of a treatment plan.         MT BANGURA MD             D: 2019   T: 2019   MT: BYRON      Name:     BEL SCHUMACHER   MRN:      [id number]        Account:      [id number]   :      1995           Service Date: 2019      Document: [id number]

## 2019-05-07 ENCOUNTER — ANCILLARY PROCEDURE (OUTPATIENT)
Dept: GENERAL RADIOLOGY | Facility: CLINIC | Age: 24
End: 2019-05-07
Attending: PEDIATRICS
Payer: COMMERCIAL

## 2019-05-07 ENCOUNTER — ANCILLARY PROCEDURE (OUTPATIENT)
Dept: MRI IMAGING | Facility: CLINIC | Age: 24
End: 2019-05-07
Attending: PEDIATRICS
Payer: COMMERCIAL

## 2019-05-07 DIAGNOSIS — M25.532 LEFT WRIST PAIN: ICD-10-CM

## 2019-05-07 DIAGNOSIS — M79.675 TOE PAIN, LEFT: ICD-10-CM

## 2019-05-07 RX ORDER — LIDOCAINE HYDROCHLORIDE 10 MG/ML
30 INJECTION, SOLUTION EPIDURAL; INFILTRATION; INTRACAUDAL; PERINEURAL ONCE
Status: COMPLETED | OUTPATIENT
Start: 2019-05-07 | End: 2019-05-07

## 2019-05-07 RX ORDER — IOPAMIDOL 408 MG/ML
20 INJECTION, SOLUTION INTRATHECAL ONCE
Status: COMPLETED | OUTPATIENT
Start: 2019-05-07 | End: 2019-05-07

## 2019-05-07 RX ADMIN — IOPAMIDOL 2 ML: 408 INJECTION, SOLUTION INTRATHECAL at 10:56

## 2019-05-07 RX ADMIN — LIDOCAINE HYDROCHLORIDE 7.5 ML: 10 INJECTION, SOLUTION EPIDURAL; INFILTRATION; INTRACAUDAL; PERINEURAL at 10:56

## 2019-05-08 ENCOUNTER — OFFICE VISIT (OUTPATIENT)
Dept: ORTHOPEDICS | Facility: CLINIC | Age: 24
End: 2019-05-08
Payer: COMMERCIAL

## 2019-05-08 VITALS
WEIGHT: 139 LBS | BODY MASS INDEX: 23.73 KG/M2 | DIASTOLIC BLOOD PRESSURE: 69 MMHG | HEART RATE: 77 BPM | SYSTOLIC BLOOD PRESSURE: 115 MMHG | HEIGHT: 64 IN

## 2019-05-08 DIAGNOSIS — M25.531 BILATERAL WRIST PAIN: ICD-10-CM

## 2019-05-08 DIAGNOSIS — M77.52 BURSITIS OF BOTH FEET: Primary | ICD-10-CM

## 2019-05-08 DIAGNOSIS — M25.532 BILATERAL WRIST PAIN: ICD-10-CM

## 2019-05-08 DIAGNOSIS — M77.51 BURSITIS OF BOTH FEET: Primary | ICD-10-CM

## 2019-05-08 RX ORDER — TRIAMCINOLONE ACETONIDE 40 MG/ML
80 INJECTION, SUSPENSION INTRA-ARTICULAR; INTRAMUSCULAR ONCE
Status: COMPLETED | OUTPATIENT
Start: 2019-05-08 | End: 2019-06-10

## 2019-05-08 ASSESSMENT — MIFFLIN-ST. JEOR: SCORE: 1370.5

## 2019-05-08 NOTE — PROGRESS NOTES
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 No new concerns.          Medications:     Current Outpatient Medications   Medication Sig     cyclobenzaprine (FLEXERIL) 10 MG tablet Take 1 tablet (10 mg) by mouth nightly as needed for muscle spasms (Patient not taking: Reported on 4/17/2019)     diclofenac (VOLTAREN) 1 % GEL topical gel Apply 2-4 grams to back up to four times daily using enclosed dosing card.     diclofenac (VOLTAREN) 1 % topical gel Apply 4 g topically 4 times daily Apply 4 grams to mid-back four times daily as needed     diclofenac (VOLTAREN) 75 MG EC tablet Take 1 tablet (75 mg) by mouth 2 times daily (Patient not taking: Reported on 10/25/2018)     gabapentin (NEURONTIN) 300 MG capsule Take 1 capsule (300 mg) by mouth nightly as needed (back pain)     lidocaine (LIDODERM) 5 % Patch Apply up to 3 patches to painful area at once for up to 12 h within a 24 h period.  Remove after 12 hours. (Patient not taking: Reported on 4/17/2019)     meloxicam (MOBIC) 15 MG tablet Take 1 tablet (15 mg) by mouth daily (Patient not taking: Reported on 4/24/2019)     norgestim-eth estrad triphasic (ORTHO TRI-CYCLEN LO) 0.18/0.215/0.25 MG-25 MCG TABS Reported on 4/5/2017     No current facility-administered medications for this visit.              Allergies:      Allergies   Allergen Reactions     No Known Drug Allergies             Review of Systems:   A comprehensive 10 point review of systems (constitutional, ENT, cardiac, peripheral vascular, respiratory, GI, , Musculoskeletal, skin, Neurological) was performed and found to be negative except as described in this note.           Physical Exam:   COMPLETE EXAMINATION:   VITAL SIGNS: There were no vitals taken for this visit.  GEN: Alert, well-nourished,  and in no distress.   NEURO: Alert and oriented to person, place, and time. Gait normal.   PSYCH: Mood, memory, affect and judgment normal.            Imaging:     Left foot MRI:  1. Small first, second and third interspace intermetatarsal bursitis.    a. Relative plantar extension of fluid pocket at the third  intermetatarsal bursa, possibly reflecting prior intermetatarsal  ligament injury versus adjacent prior capsular injury.  2. No evidence for sesamoiditis or intermetatarsal neuroma.    Left wrist MR arthrogram:  1.Partial-thickness tear of the membranous portion of the scapholunate  ligament without abnormal contrast communication to the mid carpal row  to suggest full thickness tear. Dorsal and volar components intact. No  widening of scapholunate interval.  2. No acute osseous abnormality.           Assessment:     1. Left membranous scapholunate ligament tear, likely not a pain generator  2. Bilateral wrist pain secondary to overuse  3. Bilateral great toe pain due to intermetatarsal bursitis. No pain at 3rd MTPJ where MRI (non-arthrogram) reveals possible capsule tear.        Plan:     1. Reviewed MRI results with athlete today and discussed all treatment options.  2. Recommended relative rest and sx care for wrists. No further treatment necessary.   3. Wear wide-toe box supportive shoes, avoid high heels and rest from impact activities will facilitate resolution of bursitis. She would also like to proceed with bilateral great toe steroid injections. If plantar great toe pain persists, recommend consultation with foot and ankle surgeon. Consider MRA of great toes to rule out capsule tear.    After discussion of risks, benefits and side effects. Merle Marcelleliza consented to bilateral great toe steroid injections. Under sterile technique, 1 ml of 1% lidocaine and 1 ml of Kenalog 40 mg/ml lot [id number] was injected into bilateral great toes. The procedure was well tolerated and without complication.    ATC  Angeline Colunga was present for the entire visit.    Leah Jesus

## 2019-06-10 RX ADMIN — TRIAMCINOLONE ACETONIDE 80 MG: 40 INJECTION, SUSPENSION INTRA-ARTICULAR; INTRAMUSCULAR at 15:21

## 2019-06-26 ENCOUNTER — OFFICE VISIT (OUTPATIENT)
Dept: ORTHOPEDICS | Facility: CLINIC | Age: 24
End: 2019-06-26
Payer: COMMERCIAL

## 2019-06-26 DIAGNOSIS — M25.312 INSTABILITY OF LEFT SHOULDER JOINT: Primary | ICD-10-CM

## 2019-06-26 NOTE — LETTER
6/26/2019      RE: Merle LOERA Natalie  [address] [place] [address]       Service Date: 06/26/2019      CHIEF COMPLAINT:  Followup left shoulder instability.      HISTORY OF PRESENT ILLNESS:  Merle is a 24-year-old [employer]  who is here today for an exit evaluation followup.  Merle tells me her shoulder overall is doing reasonably well.  She has had 1 minor subluxation type event.  She continues to do an ongoing rehab program.      Merle tells me that she has been selected for [employer] camp in August.  In addition, she has signed a contract with the [employer].      PHYSICAL EXAMINATION:  24-year-old female, alert and oriented, in no apparent distress.  Evaluation of bilateral shoulders reveals full and symmetrical range of motion.  Her rotator cuff strength is 5/5.  She has a positive Freestone's.  She has a noted clunk with the jerk test.  She has slight posterior apprehension, no anterior apprehension.  I am unable to get a load and shift.      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  Merle and I had a very long conversation about her options.  At this point, she is not ready to consider surgical intervention.  She would like to see how her August camp goes.  We did discuss the fact that if she plays a game for the [employer], then her medical care would be assumed by her professional team.  She understands this.      PLAN:   1.  The patient will continue a rehabilitation program.   2.  She will participate in the August camp for [employer].   3.  I will see her back in 3-4 months for a routine recheck.  If she is symptomatic, we may consider arthroscopic stabilization.      I spent 15 minutes with this patient, 10 minutes dedicated to counseling, education and development of a treatment plan.         MT BANGURA MD             D: 06/29/2019   T: 06/30/2019   MT: BYRON      Name:     NATALIE  BEL   MRN:      [id number]        Account:      [id number]   :      1995           Service Date: 2019      Document: [id number]       Kemal Rosa MD

## 2019-06-30 NOTE — PROGRESS NOTES
Service Date: 2019      CHIEF COMPLAINT:  Followup left shoulder instability.      HISTORY OF PRESENT ILLNESS:  Bel is a 24-year-old [employer]  who is here today for an exit evaluation followup.  Bel tells me her shoulder overall is doing reasonably well.  She has had 1 minor subluxation type event.  She continues to do an ongoing rehab program.      Bel tells me that she has been selected for [employer] camp in August.  In addition, she has signed a contract with the [employer].      PHYSICAL EXAMINATION:  24-year-old female, alert and oriented, in no apparent distress.  Evaluation of bilateral shoulders reveals full and symmetrical range of motion.  Her rotator cuff strength is 5/5.  She has a positive Washburn's.  She has a noted clunk with the jerk test.  She has slight posterior apprehension, no anterior apprehension.  I am unable to get a load and shift.      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  Bel and I had a very long conversation about her options.  At this point, she is not ready to consider surgical intervention.  She would like to see how her August camp goes.  We did discuss the fact that if she plays a game for the [employer], then her medical care would be assumed by her professional team.  She understands this.      PLAN:   1.  The patient will continue a rehabilitation program.   2.  She will participate in the August camp for [employer].   3.  I will see her back in 3-4 months for a routine recheck.  If she is symptomatic, we may consider arthroscopic stabilization.      I spent 15 minutes with this patient, 10 minutes dedicated to counseling, education and development of a treatment plan.         MT BANGURA MD             D: 2019   T: 2019   MT: BYRON      Name:     BEL SCHUMACHER   MRN:      [id number]        Account:      [id number]   :      1995            Service Date: 06/26/2019      Document: [id number]

## 2019-08-12 NOTE — PROGRESS NOTES
"Service Date: 04/24/2019      CHIEF COMPLAINT:  Left shoulder instability.      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  She presents to discuss her left shoulder.  Merle did reasonably well over the season.  However, her shoulder continues to have issues with instability.  She feels \"weak.\"  Her symptoms are primarily when she brings her arm across her body.  She has a recent new MRI.      PHYSICAL EXAMINATION:  Evaluation of bilateral shoulders reveal full and symmetrical range of motion.  Her rotator cuff strength is 5/5.  She has a negative apprehension.  She has positive posterior apprehension.  She has a negative anterior load and shift.  She has a 2+ posterior load with some crepitation.      MRI:  I reviewed the patient's new MRI.  She does have an older reverse Hill-Sachs.  She does have a posterior labral tear.  No bony loss but her rotator cuff is intact.      IMPRESSION:  24-year-old [employer]  with left shoulder posterior instability.  Merle does have a posterior labral tear.  We had a long conversation today about options.  She is interested in surgery, but would like to wait to see if she makes a [employer] camp.  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  We discussed the surgery.  I explained that we would arthroscopically repair her labrum.  We discussed the risks including bleeding, infection, nerve damage, complications from anesthesia, blood clot, etc.  We also discussed the more pertinent risks with this type of surgery, including failure of the repair with resultant instability.  She may end up with a stiff shoulder that could be problematic.  There could be hardware complications.  She may develop arthritis over time.  She understands the surgery as well as the risks.      PLAN:   1.  We will " wait to see if Bel is invited to a [employer] camp.  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   2.  I will speak with Shiv Handley about our policy regarding pro athletics when our athletes participate in a league that does not offer health insurance.   3.  When we decide to proceed with surgery, the surgery will be a left shoulder arthroscopic posterior labral repair.  This will be done in the lateral decubitus position.      I spent 15 minutes with this patient, 10 minutes dedicated to counseling, education and development of a treatment plan.         MT BANGURA MD             D: 2019   T: 2019   MT: BYRON      Name:     BEL SCHUMACHER   MRN:      [id number]        Account:      [id number]   :      1995           Service Date: 2019      Document: [id number]     no Strong peripheral pulses

## 2019-09-30 ENCOUNTER — HEALTH MAINTENANCE LETTER (OUTPATIENT)
Age: 24
End: 2019-09-30

## 2021-01-15 ENCOUNTER — HEALTH MAINTENANCE LETTER (OUTPATIENT)
Age: 26
End: 2021-01-15

## 2021-10-24 ENCOUNTER — HEALTH MAINTENANCE LETTER (OUTPATIENT)
Age: 26
End: 2021-10-24

## 2022-02-13 ENCOUNTER — HEALTH MAINTENANCE LETTER (OUTPATIENT)
Age: 27
End: 2022-02-13

## 2022-10-15 ENCOUNTER — HEALTH MAINTENANCE LETTER (OUTPATIENT)
Age: 27
End: 2022-10-15

## 2023-03-26 ENCOUNTER — HEALTH MAINTENANCE LETTER (OUTPATIENT)
Age: 28
End: 2023-03-26

## (undated) RX ORDER — LIDOCAINE HYDROCHLORIDE 10 MG/ML
INJECTION, SOLUTION EPIDURAL; INFILTRATION; INTRACAUDAL; PERINEURAL
Status: DISPENSED
Start: 2019-05-07